# Patient Record
Sex: FEMALE | Race: WHITE
[De-identification: names, ages, dates, MRNs, and addresses within clinical notes are randomized per-mention and may not be internally consistent; named-entity substitution may affect disease eponyms.]

---
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
## 2021-12-20 ENCOUNTER — HOSPITAL ENCOUNTER (EMERGENCY)
Dept: HOSPITAL 4 - SED | Age: 21
Discharge: HOME | End: 2021-12-20
Payer: MEDICAID

## 2021-12-20 VITALS — SYSTOLIC BLOOD PRESSURE: 123 MMHG

## 2021-12-20 VITALS — WEIGHT: 135 LBS | BODY MASS INDEX: 22.49 KG/M2 | HEIGHT: 65 IN

## 2021-12-20 DIAGNOSIS — J30.9: ICD-10-CM

## 2021-12-20 DIAGNOSIS — Z79.899: ICD-10-CM

## 2021-12-20 DIAGNOSIS — Z88.1: ICD-10-CM

## 2021-12-20 DIAGNOSIS — J45.901: Primary | ICD-10-CM

## 2021-12-20 PROCEDURE — 99283 EMERGENCY DEPT VISIT LOW MDM: CPT
